# Patient Record
Sex: FEMALE | Race: WHITE | NOT HISPANIC OR LATINO | URBAN - METROPOLITAN AREA
[De-identification: names, ages, dates, MRNs, and addresses within clinical notes are randomized per-mention and may not be internally consistent; named-entity substitution may affect disease eponyms.]

---

## 2017-07-25 ENCOUNTER — APPOINTMENT (OUTPATIENT)
Dept: URBAN - METROPOLITAN AREA CLINIC 200 | Age: 67
Setting detail: DERMATOLOGY
End: 2017-07-26

## 2017-07-25 DIAGNOSIS — L57.0 ACTINIC KERATOSIS: ICD-10-CM

## 2017-07-25 DIAGNOSIS — L57.8 OTHER SKIN CHANGES DUE TO CHRONIC EXPOSURE TO NONIONIZING RADIATION: ICD-10-CM

## 2017-07-25 PROBLEM — Z85.828 PERSONAL HISTORY OF OTHER MALIGNANT NEOPLASM OF SKIN: Status: ACTIVE | Noted: 2017-07-25

## 2017-07-25 PROBLEM — D48.5 NEOPLASM OF UNCERTAIN BEHAVIOR OF SKIN: Status: ACTIVE | Noted: 2017-07-25

## 2017-07-25 PROCEDURE — 11100: CPT | Mod: 59

## 2017-07-25 PROCEDURE — OTHER BIOPSY BY SHAVE METHOD: OTHER

## 2017-07-25 PROCEDURE — 99213 OFFICE O/P EST LOW 20 MIN: CPT | Mod: 25

## 2017-07-25 PROCEDURE — 17000 DESTRUCT PREMALG LESION: CPT

## 2017-07-25 PROCEDURE — OTHER COUNSELING: OTHER

## 2017-07-25 PROCEDURE — OTHER LIQUID NITROGEN: OTHER

## 2017-07-25 ASSESSMENT — LOCATION ZONE DERM
LOCATION ZONE: LEG
LOCATION ZONE: TRUNK

## 2017-07-25 ASSESSMENT — LOCATION SIMPLE DESCRIPTION DERM
LOCATION SIMPLE: RIGHT PRETIBIAL REGION
LOCATION SIMPLE: RIGHT UPPER BACK

## 2017-07-25 ASSESSMENT — LOCATION DETAILED DESCRIPTION DERM
LOCATION DETAILED: RIGHT DISTAL PRETIBIAL REGION
LOCATION DETAILED: RIGHT LATERAL UPPER BACK

## 2017-07-25 NOTE — PROCEDURE: BIOPSY BY SHAVE METHOD
Dressing: bandage
Notification Instructions: Patient will be notified of biopsy results. However, patient instructed to call the office if not contacted within 2 weeks.
Bill For Surgical Tray: no
Biopsy Method: Personna blade
Type Of Destruction Used: Curettage
Additional Anesthesia Volume In Cc (Will Not Render If 0): 0
Hemostasis: Drysol
Anesthesia Volume In Cc (Will Not Render If 0): 0.1
consent was obtained and risks were reviewed including but not limited to scarring, infection, bleeding, scabbing, incomplete removal, nerve damage and allergy to anesthesia.
Size Of Lesion In Cm: 0.4
Post-Care Instructions: I reviewed with the patient in detail post-care instructions. Patient is to keep the biopsy site dry overnight, and then apply bacitracin twice daily until healed. Patient may apply hydrogen peroxide soaks to remove any crusting.
Billing Type: Third-Party Bill
Detail Level: Detailed
Wound Care: Vaseline
Biopsy Type: H and E
Anesthesia Type: 1% lidocaine with epinephrine

## 2017-07-25 NOTE — PROCEDURE: LIQUID NITROGEN
Post-Care Instructions: I reviewed with the patient in detail post-care instructions. Patient is to wear sunprotection, and avoid picking at any of the treated lesions. Pt may apply Vaseline to crusted or scabbing areas.
Detail Level: Detailed
Number Of Freeze-Thaw Cycles: 2 freeze-thaw cycles
Consent: The patient's consent was obtained including but not limited to risks of crusting, scabbing, blistering, scarring, darker or lighter pigmentary change, recurrence, incomplete removal and infection.
Render Post-Care Instructions In Note?: no
Duration Of Freeze Thaw-Cycle (Seconds): 10

## 2017-10-11 ENCOUNTER — APPOINTMENT (OUTPATIENT)
Dept: URBAN - METROPOLITAN AREA CLINIC 200 | Age: 67
Setting detail: DERMATOLOGY
End: 2017-10-15

## 2017-10-11 DIAGNOSIS — L57.0 ACTINIC KERATOSIS: ICD-10-CM

## 2017-10-11 PROCEDURE — OTHER LIQUID NITROGEN: OTHER

## 2017-10-11 PROCEDURE — 17000 DESTRUCT PREMALG LESION: CPT

## 2017-10-11 ASSESSMENT — LOCATION DETAILED DESCRIPTION DERM: LOCATION DETAILED: LEFT SUPERIOR VERMILION BORDER

## 2017-10-11 ASSESSMENT — LOCATION SIMPLE DESCRIPTION DERM: LOCATION SIMPLE: LEFT UPPER LIP

## 2017-10-11 ASSESSMENT — LOCATION ZONE DERM: LOCATION ZONE: LIP

## 2017-10-11 NOTE — PROCEDURE: LIQUID NITROGEN
Post-Care Instructions: I reviewed with the patient in detail post-care instructions. Patient is to wear sunprotection, and avoid picking at any of the treated lesions. Pt may apply Vaseline to crusted or scabbing areas.
Consent: The patient's consent was obtained including but not limited to risks of crusting, scabbing, blistering, scarring, darker or lighter pigmentary change, recurrence, incomplete removal and infection.
Number Of Freeze-Thaw Cycles: 2 freeze-thaw cycles
Render Post-Care Instructions In Note?: no
Detail Level: Detailed
Duration Of Freeze Thaw-Cycle (Seconds): 10

## 2021-02-14 ENCOUNTER — APPOINTMENT (EMERGENCY)
Dept: RADIOLOGY | Facility: HOSPITAL | Age: 71
End: 2021-02-14
Payer: MEDICARE

## 2021-02-14 ENCOUNTER — APPOINTMENT (EMERGENCY)
Dept: RADIOLOGY | Facility: HOSPITAL | Age: 71
End: 2021-02-14
Attending: EMERGENCY MEDICINE
Payer: MEDICARE

## 2021-02-14 ENCOUNTER — HOSPITAL ENCOUNTER (EMERGENCY)
Facility: HOSPITAL | Age: 71
Discharge: HOME | End: 2021-02-14
Attending: EMERGENCY MEDICINE
Payer: MEDICARE

## 2021-02-14 VITALS
WEIGHT: 200 LBS | BODY MASS INDEX: 34.15 KG/M2 | HEIGHT: 64 IN | SYSTOLIC BLOOD PRESSURE: 150 MMHG | OXYGEN SATURATION: 99 % | TEMPERATURE: 99 F | RESPIRATION RATE: 18 BRPM | HEART RATE: 66 BPM | DIASTOLIC BLOOD PRESSURE: 70 MMHG

## 2021-02-14 DIAGNOSIS — G89.29 CHRONIC CHEST PAIN: Primary | ICD-10-CM

## 2021-02-14 DIAGNOSIS — M25.562 CHRONIC PAIN OF LEFT KNEE: ICD-10-CM

## 2021-02-14 DIAGNOSIS — G89.29 CHRONIC PAIN OF LEFT KNEE: ICD-10-CM

## 2021-02-14 DIAGNOSIS — R07.9 CHRONIC CHEST PAIN: Primary | ICD-10-CM

## 2021-02-14 LAB
ALBUMIN SERPL-MCNC: 3.9 G/DL (ref 3.4–5)
ALP SERPL-CCNC: 81 IU/L (ref 35–126)
ALT SERPL-CCNC: 13 IU/L (ref 11–54)
ANION GAP SERPL CALC-SCNC: 7 MEQ/L (ref 3–15)
AST SERPL-CCNC: 23 IU/L (ref 15–41)
BASOPHILS # BLD: 0.04 K/UL (ref 0.01–0.1)
BASOPHILS NFR BLD: 0.6 %
BILIRUB SERPL-MCNC: 0.5 MG/DL (ref 0.3–1.2)
BUN SERPL-MCNC: 16 MG/DL (ref 8–20)
CALCIUM SERPL-MCNC: 8.7 MG/DL (ref 8.9–10.3)
CHLORIDE SERPL-SCNC: 105 MEQ/L (ref 98–109)
CO2 SERPL-SCNC: 27 MEQ/L (ref 22–32)
CREAT SERPL-MCNC: 0.7 MG/DL (ref 0.6–1.1)
D DIMER PPP IA.FEU-MCNC: 0.56 UG/ML FEU (ref 0–0.5)
DIFFERENTIAL METHOD BLD: ABNORMAL
EOSINOPHIL # BLD: 0.48 K/UL (ref 0.04–0.36)
EOSINOPHIL NFR BLD: 6.8 %
ERYTHROCYTE [DISTWIDTH] IN BLOOD BY AUTOMATED COUNT: 13.6 % (ref 11.7–14.4)
GFR SERPL CREATININE-BSD FRML MDRD: >60 ML/MIN/1.73M*2
GLUCOSE SERPL-MCNC: 101 MG/DL (ref 70–99)
HCT VFR BLDCO AUTO: 39.1 % (ref 35–45)
HGB BLD-MCNC: 12.4 G/DL (ref 11.8–15.7)
IMM GRANULOCYTES # BLD AUTO: 0.01 K/UL (ref 0–0.08)
IMM GRANULOCYTES NFR BLD AUTO: 0.1 %
LYMPHOCYTES # BLD: 1.94 K/UL (ref 1.2–3.5)
LYMPHOCYTES NFR BLD: 27.3 %
MCH RBC QN AUTO: 29.1 PG (ref 28–33.2)
MCHC RBC AUTO-ENTMCNC: 31.7 G/DL (ref 32.2–35.5)
MCV RBC AUTO: 91.8 FL (ref 83–98)
MONOCYTES # BLD: 0.52 K/UL (ref 0.28–0.8)
MONOCYTES NFR BLD: 7.3 %
NEUTROPHILS # BLD: 4.12 K/UL (ref 1.7–7)
NEUTS SEG NFR BLD: 57.9 %
NRBC BLD-RTO: 0 %
PDW BLD AUTO: 10.5 FL (ref 9.4–12.3)
PLATELET # BLD AUTO: 287 K/UL (ref 150–369)
POTASSIUM SERPL-SCNC: 5 MEQ/L (ref 3.6–5.1)
PROT SERPL-MCNC: 7.2 G/DL (ref 6–8.2)
RBC # BLD AUTO: 4.26 M/UL (ref 3.93–5.22)
SODIUM SERPL-SCNC: 139 MEQ/L (ref 136–144)
TROPONIN I SERPL-MCNC: <0.02 NG/ML
WBC # BLD AUTO: 7.11 K/UL (ref 3.8–10.5)

## 2021-02-14 PROCEDURE — 96361 HYDRATE IV INFUSION ADD-ON: CPT

## 2021-02-14 PROCEDURE — 3E0337Z INTRODUCTION OF ELECTROLYTIC AND WATER BALANCE SUBSTANCE INTO PERIPHERAL VEIN, PERCUTANEOUS APPROACH: ICD-10-PCS | Performed by: EMERGENCY MEDICINE

## 2021-02-14 PROCEDURE — 25800000 HC PHARMACY IV SOLUTIONS: Performed by: NURSE PRACTITIONER

## 2021-02-14 PROCEDURE — 80053 COMPREHEN METABOLIC PANEL: CPT | Performed by: NURSE PRACTITIONER

## 2021-02-14 PROCEDURE — 96360 HYDRATION IV INFUSION INIT: CPT

## 2021-02-14 PROCEDURE — 73564 X-RAY EXAM KNEE 4 OR MORE: CPT | Mod: LT

## 2021-02-14 PROCEDURE — 93005 ELECTROCARDIOGRAM TRACING: CPT | Performed by: NURSE PRACTITIONER

## 2021-02-14 PROCEDURE — 71045 X-RAY EXAM CHEST 1 VIEW: CPT

## 2021-02-14 PROCEDURE — 36415 COLL VENOUS BLD VENIPUNCTURE: CPT | Performed by: NURSE PRACTITIONER

## 2021-02-14 PROCEDURE — 99284 EMERGENCY DEPT VISIT MOD MDM: CPT | Mod: 25

## 2021-02-14 PROCEDURE — 84484 ASSAY OF TROPONIN QUANT: CPT | Performed by: NURSE PRACTITIONER

## 2021-02-14 PROCEDURE — 85379 FIBRIN DEGRADATION QUANT: CPT | Performed by: NURSE PRACTITIONER

## 2021-02-14 PROCEDURE — 85025 COMPLETE CBC W/AUTO DIFF WBC: CPT | Performed by: NURSE PRACTITIONER

## 2021-02-14 RX ORDER — FLUOXETINE HYDROCHLORIDE 20 MG/1
20 CAPSULE ORAL DAILY
COMMUNITY
Start: 2020-12-21

## 2021-02-14 RX ORDER — ACETAMINOPHEN AND CODEINE PHOSPHATE 300; 30 MG/1; MG/1
TABLET ORAL
COMMUNITY
Start: 2021-02-08

## 2021-02-14 RX ORDER — SODIUM CHLORIDE 9 MG/ML
100 INJECTION, SOLUTION INTRAVENOUS CONTINUOUS
Status: DISCONTINUED | OUTPATIENT
Start: 2021-02-14 | End: 2021-02-14 | Stop reason: HOSPADM

## 2021-02-14 RX ORDER — FAMOTIDINE 20 MG/1
20 TABLET, FILM COATED ORAL 2 TIMES DAILY
Qty: 60 TABLET | Refills: 0 | Status: SHIPPED | OUTPATIENT
Start: 2021-02-14 | End: 2021-03-16

## 2021-02-14 RX ADMIN — SODIUM CHLORIDE 100 ML/HR: 9 INJECTION, SOLUTION INTRAVENOUS at 13:57

## 2021-02-14 ASSESSMENT — ENCOUNTER SYMPTOMS
SHORTNESS OF BREATH: 1
ABDOMINAL PAIN: 0
HEADACHES: 0
FEVER: 0
CONFUSION: 0
MYALGIAS: 1

## 2021-02-14 NOTE — ED ATTESTATION NOTE
2/14/20212:49 PM  I have personally seen and examined the patient.  I reviewed and agree with the PA/NP/Resident's assessment and plan of care.    My examination, assessment, and plan of care of Susi Burris is as follows:  The patient presents with chest pain shortness of breath.  70-year-old female comes emergency room of ongoing chest discomfort.  She has had it for several months.  Seems to be worse when she lays down better when she sits up.  She talked to her primary care doctor about it who referred her to a cardiologist but she has not gotten to see him yet.  Also complaining of shortness of breath with exertion.  No fever chills no cough.  Does have a history of pneumonia 3 times.  Additionally the patient fell yesterday and injured her left knee.  Exam: Well-developed female no obvious distress.  HEENT is unremarkable.  Neck is supple full range of motion.  No respiratory distress.  Awake alert and oriented.  Ambulates with a slight limp.  Impression/Plan: We will check labs and imaging.  Patient's vital signs were all unremarkable.  Her work-up was notable for a slightly elevated D-dimer but age-adjusted is normal.  Do not think she needs to be admitted at this point.  Do recommend to follow-up with cardiology for further testing.    Vital Signs Review: Vital signs have been reviewed. The oxygen saturation is  SpO2: 99 % which is normal.    I was physically present for the key/critical portions of the following procedures: None    This document was created using dragon dictation software.  There might be some typographical errors due to this technology.     Mono Jalloh MD  02/14/21 8485

## 2021-02-14 NOTE — ED PROVIDER NOTES
Chief Complaint   Patient presents with   • Chest Pain   • Shortness of Breath   • Fall   • Lower Extremity Issue        HPI   70-year-old female reports to emergency room today with a complaint of chest pain that has been ongoing for months.  She states that she feels a squeezing sensation in her chest it comes and goes is worse whenever she is lying on her left side she feels that she has shortness of breath with exertion. No pain at this time. She was out of her home on a break  from her son and came to ED for a check up    Very concerned that she has been unable to get her covid vaccine- requesting immunization      She has been advised by her primary care physician to see a cardiologist and has been given a consultation but has never seen one.  She feels that she has been unable to make this appointment due to the stressful living situation she has with a severely psychotic son who requires a lot of her attention.  In addition to this complaint she has had left knee pain which is acute on chronic she was advised to see the orthopedist but has been unable to keep that appointment.  Yesterday she fell on the ice injuring the left knee again.      Past Medical History:   Diagnosis Date   • Depression    • Pneumonia          Past Surgical History:   Procedure Laterality Date   •  SECTION         History reviewed. No pertinent family history.    Social History     Tobacco Use   • Smoking status: Never Smoker   • Smokeless tobacco: Never Used   Substance Use Topics   • Alcohol use: Not Currently   • Drug use: Never       Systems Reviewed from Nursing Triage:              Review of Systems   Constitutional: Negative for fever.   HENT: Negative for congestion.    Respiratory: Positive for shortness of breath.    Cardiovascular: Positive for chest pain.   Gastrointestinal: Negative for abdominal pain.   Musculoskeletal: Positive for myalgias.   Allergic/Immunologic: Negative for immunocompromised state.  "  Neurological: Negative for headaches.   Psychiatric/Behavioral: Negative for confusion.            ED Triage Vitals [02/14/21 1328]   Temp Heart Rate Resp BP SpO2   37.2 °C (99 °F) 77 20 126/60 99 %      Temp src Heart Rate Source Patient Position BP Location FiO2 (%) (Set)   -- -- Lying Right upper arm --       Vitals:    02/14/21 1322 02/14/21 1328   BP:  126/60   BP Location:  Right upper arm   Patient Position:  Lying   Pulse:  77   Resp:  20   Temp:  37.2 °C (99 °F)   SpO2:  99%   Weight: 90.7 kg (200 lb)    Height: 1.626 m (5' 4\")                          Physical Exam  Vitals signs and nursing note reviewed.   Constitutional:       Appearance: She is well-developed.   HENT:      Head: Normocephalic.   Neck:      Musculoskeletal: Normal range of motion and neck supple.   Cardiovascular:      Rate and Rhythm: Normal rate and regular rhythm.      Heart sounds: Normal heart sounds.   Pulmonary:      Effort: Pulmonary effort is normal.   Chest:      Chest wall: No tenderness.   Abdominal:      Palpations: Abdomen is soft.      Tenderness: There is no abdominal tenderness.   Musculoskeletal:      Left lower leg: She exhibits tenderness.      Comments: Left knee tenderness   Skin:     General: Skin is warm.   Neurological:      Mental Status: She is alert and oriented to person, place, and time.   Psychiatric:         Mood and Affect: Mood is anxious.              Labs Reviewed   CBC AND DIFF - Abnormal       Result Value    WBC 7.11      RBC 4.26      Hemoglobin 12.4      Hematocrit 39.1      MCV 91.8      MCH 29.1      MCHC 31.7 (*)     RDW 13.6      Platelets 287      MPV 10.5      Differential Type Auto      nRBC 0.0      Immature Granulocytes 0.1      Neutrophils 57.9      Lymphocytes 27.3      Monocytes 7.3      Eosinophils 6.8      Basophils 0.6      Immature Granulocytes, Absolute 0.01      Neutrophils, Absolute 4.12      Lymphocytes, Absolute 1.94      Monocytes, Absolute 0.52      Eosinophils, Absolute " 0.48 (*)     Basophils, Absolute 0.04     COMPREHENSIVE METABOLIC PANEL   TROPONIN I   RAINBOW DRAW PANEL    Narrative:     The following orders were created for panel order [facility] Draw Panel.  Procedure                               Abnormality         Status                     ---------                               -----------         ------                     RAINBOW RED[[id number]]                                       In process                 RAINBOW LT BLUE[[id number]]                                                              RAINBOW GOLD[[id number]]                                      In process                   Please view results for these tests on the individual orders.   D-DIMER   RAINBOW RED   RAINBOW LT BLUE   RAINBOW GOLD       ECG 12 lead    (Results Pending)   X-RAY CHEST 1 VIEW    (Results Pending)   X-RAY KNEE LEFT 4+ VIEWS    (Results Pending)         Procedures    Final diagnoses:   None       ED Course & MDM     ED Course as of Feb 14 2002   Sun Feb 14, 2021   6009 70-year-old female reports to emergency room for evaluation due to complaints of months of chest pain and pain in her left knee.  In addition to exertional shortness of breath  The patient has been advised by her primary care physician to see both an orthopedist and a cardiologist but she has not been able to do this due to dealing with her son who has a significant psychological illness.  No acute findings are noted on today's EKG her troponin is negative D-dimer is acceptable for her age.  Chest x-ray is negative and so was her knee.  I strongly feel that she should see a cardiologist and I am giving her the name of cardiologist and we will help arrange an appointment -patient will see orthopedics in addition  In addition to her follow-ups we will place her on  Pepcid  Patient was seen by attending Dr. Jalloh who is in agreement with this plan    [LB]      ED Course User Index  [LB] Dian Tang CRNP         Clinical  Impressions as of Feb 14 2002   Chronic chest pain   Chronic pain of left knee           MDM    1:54 PM    Impression: Chest pain exertional shortness of breath acute on chronic knee pain    Plan: X-ray her left knee cardiac work-up with a D-dimer chest x-ray    RESHMA [place]  2/14/2021           Dian Tang, RESHMA  02/14/21 1357       Dian Tang CRNP  02/14/21 2005

## 2021-02-14 NOTE — DISCHARGE INSTRUCTIONS
You were seen in the emergency room today related to chest pain that has been going on for several months.  On today's testing no acute findings were noted with your troponin or your EKG we feel strongly that you should need to follow-up with a cardiologist for further evaluation.  It is important that you take care of yourself at this time    I am giving the cardiologist office your contact information and they will call you to arrange a follow-up appointment    Follow-up with the orthopedic physician related to knee pain    Return for worsening symptoms    We will start you on Pepcid 1 tablet twice a day in the if fact that some of this may be related to reflux

## 2021-02-15 LAB
ATRIAL RATE: 75
P AXIS: 61
PR INTERVAL: 126
QRS DURATION: 78
QT INTERVAL: 380
QTC CALCULATION(BAZETT): 424
R AXIS: 65
T WAVE AXIS: 41
VENTRICULAR RATE: 75

## 2025-07-06 ENCOUNTER — HOSPITAL ENCOUNTER (EMERGENCY)
Facility: HOSPITAL | Age: 75
Discharge: HOME | End: 2025-07-06
Attending: EMERGENCY MEDICINE | Admitting: EMERGENCY MEDICINE
Payer: COMMERCIAL

## 2025-07-06 VITALS
DIASTOLIC BLOOD PRESSURE: 65 MMHG | SYSTOLIC BLOOD PRESSURE: 148 MMHG | WEIGHT: 203.93 LBS | HEART RATE: 86 BPM | HEIGHT: 64 IN | OXYGEN SATURATION: 95 % | BODY MASS INDEX: 34.82 KG/M2 | TEMPERATURE: 98.8 F | RESPIRATION RATE: 18 BRPM

## 2025-07-06 DIAGNOSIS — R19.7 VOMITING AND DIARRHEA: Primary | ICD-10-CM

## 2025-07-06 DIAGNOSIS — R11.10 VOMITING AND DIARRHEA: Primary | ICD-10-CM

## 2025-07-06 LAB
ALBUMIN SERPL-MCNC: 4.1 G/DL (ref 3.5–5.7)
ALP SERPL-CCNC: 80 IU/L (ref 34–125)
ALT SERPL-CCNC: 10 IU/L (ref 7–52)
ANION GAP SERPL CALC-SCNC: 6 MEQ/L (ref 3–15)
AST SERPL-CCNC: 13 IU/L (ref 13–39)
ATRIAL RATE: 86
BASOPHILS # BLD: 0.04 K/UL (ref 0.01–0.1)
BASOPHILS NFR BLD: 0.5 %
BILIRUB SERPL-MCNC: 0.4 MG/DL (ref 0.3–1.2)
BUN SERPL-MCNC: 14 MG/DL (ref 7–25)
CALCIUM SERPL-MCNC: 9.1 MG/DL (ref 8.6–10.3)
CHLORIDE SERPL-SCNC: 105 MEQ/L (ref 98–107)
CO2 SERPL-SCNC: 28 MEQ/L (ref 21–31)
CREAT SERPL-MCNC: 0.8 MG/DL (ref 0.6–1.2)
DIFFERENTIAL METHOD BLD: ABNORMAL
EGFRCR SERPLBLD CKD-EPI 2021: >60 ML/MIN/1.73M*2
EOSINOPHIL # BLD: 0.41 K/UL (ref 0.04–0.36)
EOSINOPHIL NFR BLD: 5.4 %
ERYTHROCYTE [DISTWIDTH] IN BLOOD BY AUTOMATED COUNT: 13.3 % (ref 11.7–14.4)
GLUCOSE SERPL-MCNC: 151 MG/DL (ref 70–99)
HCT VFR BLD AUTO: 36 % (ref 35–45)
HGB BLD-MCNC: 11.9 G/DL (ref 11.8–15.7)
IMM GRANULOCYTES # BLD AUTO: 0.02 K/UL (ref 0–0.08)
IMM GRANULOCYTES NFR BLD AUTO: 0.3 %
LIPASE SERPL-CCNC: 35 U/L (ref 11–82)
LYMPHOCYTES # BLD: 0.6 K/UL (ref 1.2–3.5)
LYMPHOCYTES NFR BLD: 8 %
MCH RBC QN AUTO: 29.8 PG (ref 28–33.2)
MCHC RBC AUTO-ENTMCNC: 33.1 G/DL (ref 32.2–35.5)
MCV RBC AUTO: 90 FL (ref 83–98)
MONOCYTES # BLD: 1.14 K/UL (ref 0.28–0.8)
MONOCYTES NFR BLD: 15.1 %
NEUTROPHILS # BLD: 5.32 K/UL (ref 1.7–7)
NEUTS SEG NFR BLD: 70.7 %
NRBC BLD-RTO: 0 %
P AXIS: 72
PLATELET # BLD AUTO: 205 K/UL (ref 150–369)
PMV BLD AUTO: 11.3 FL (ref 9.4–12.3)
POTASSIUM SERPL-SCNC: 4.3 MEQ/L (ref 3.5–5.1)
PR INTERVAL: 130
PROT SERPL-MCNC: 7 G/DL (ref 6–8.2)
QRS DURATION: 82
QT INTERVAL: 366
QTC CALCULATION(BAZETT): 437
R AXIS: 62
RBC # BLD AUTO: 4 M/UL (ref 3.93–5.22)
SODIUM SERPL-SCNC: 139 MEQ/L (ref 136–145)
T WAVE AXIS: 48
VENTRICULAR RATE: 86
WBC # BLD AUTO: 7.53 K/UL (ref 3.8–10.5)

## 2025-07-06 PROCEDURE — 96374 THER/PROPH/DIAG INJ IV PUSH: CPT

## 2025-07-06 PROCEDURE — 3E033GC INTRODUCTION OF OTHER THERAPEUTIC SUBSTANCE INTO PERIPHERAL VEIN, PERCUTANEOUS APPROACH: ICD-10-PCS | Performed by: EMERGENCY MEDICINE

## 2025-07-06 PROCEDURE — 85025 COMPLETE CBC W/AUTO DIFF WBC: CPT | Performed by: EMERGENCY MEDICINE

## 2025-07-06 PROCEDURE — 36415 COLL VENOUS BLD VENIPUNCTURE: CPT | Performed by: EMERGENCY MEDICINE

## 2025-07-06 PROCEDURE — 3E0337Z INTRODUCTION OF ELECTROLYTIC AND WATER BALANCE SUBSTANCE INTO PERIPHERAL VEIN, PERCUTANEOUS APPROACH: ICD-10-PCS | Performed by: EMERGENCY MEDICINE

## 2025-07-06 PROCEDURE — 63700000 HC SELF-ADMINISTRABLE DRUG: Performed by: EMERGENCY MEDICINE

## 2025-07-06 PROCEDURE — 99284 EMERGENCY DEPT VISIT MOD MDM: CPT | Mod: 25

## 2025-07-06 PROCEDURE — 96376 TX/PRO/DX INJ SAME DRUG ADON: CPT

## 2025-07-06 PROCEDURE — 83690 ASSAY OF LIPASE: CPT | Performed by: EMERGENCY MEDICINE

## 2025-07-06 PROCEDURE — 93010 ELECTROCARDIOGRAM REPORT: CPT | Performed by: INTERNAL MEDICINE

## 2025-07-06 PROCEDURE — 63600000 HC DRUGS/DETAIL CODE: Mod: JZ | Performed by: EMERGENCY MEDICINE

## 2025-07-06 PROCEDURE — 96361 HYDRATE IV INFUSION ADD-ON: CPT

## 2025-07-06 PROCEDURE — 25800000 HC PHARMACY IV SOLUTIONS: Performed by: EMERGENCY MEDICINE

## 2025-07-06 PROCEDURE — 93005 ELECTROCARDIOGRAM TRACING: CPT | Performed by: EMERGENCY MEDICINE

## 2025-07-06 PROCEDURE — 80053 COMPREHEN METABOLIC PANEL: CPT | Performed by: EMERGENCY MEDICINE

## 2025-07-06 RX ORDER — LOPERAMIDE HCL 2 MG
2 TABLET ORAL 4 TIMES DAILY PRN
Qty: 20 TABLET | Refills: 0 | Status: SHIPPED | OUTPATIENT
Start: 2025-07-06

## 2025-07-06 RX ORDER — ONDANSETRON HYDROCHLORIDE 2 MG/ML
4 INJECTION, SOLUTION INTRAVENOUS ONCE
Status: COMPLETED | OUTPATIENT
Start: 2025-07-06 | End: 2025-07-06

## 2025-07-06 RX ORDER — ACETAMINOPHEN 325 MG/1
975 TABLET ORAL ONCE
Status: COMPLETED | OUTPATIENT
Start: 2025-07-06 | End: 2025-07-06

## 2025-07-06 RX ORDER — ONDANSETRON 4 MG/1
4 TABLET, ORALLY DISINTEGRATING ORAL EVERY 8 HOURS PRN
Qty: 20 TABLET | Refills: 0 | Status: SHIPPED | OUTPATIENT
Start: 2025-07-06 | End: 2025-07-13

## 2025-07-06 RX ORDER — LOPERAMIDE HYDROCHLORIDE 2 MG/1
4 CAPSULE ORAL AS NEEDED
Status: DISCONTINUED | OUTPATIENT
Start: 2025-07-06 | End: 2025-07-06 | Stop reason: HOSPADM

## 2025-07-06 RX ADMIN — ACETAMINOPHEN 975 MG: 325 TABLET ORAL at 07:25

## 2025-07-06 RX ADMIN — SODIUM CHLORIDE 1000 ML: 9 INJECTION, SOLUTION INTRAVENOUS at 06:09

## 2025-07-06 RX ADMIN — ONDANSETRON 4 MG: 2 INJECTION INTRAMUSCULAR; INTRAVENOUS at 07:24

## 2025-07-06 RX ADMIN — ONDANSETRON 4 MG: 2 INJECTION INTRAMUSCULAR; INTRAVENOUS at 06:09

## 2025-07-06 NOTE — DISCHARGE INSTRUCTIONS
You were seen in the emergency department for concerns for nausea vomiting diarrhea.  Please use the medications we have prescribed    If you have worsening abdominal pain high-grade fevers or you are unable to tolerate your medications please return to the ER

## 2025-07-06 NOTE — ED PROVIDER NOTES
Emergency Medicine Note  HPI   HISTORY OF PRESENT ILLNESS     74-year-old female coming in today with concerns for nausea vomiting diarrhea states that she had a frozen meal that she left out put in the fridge and then reheated and feels that she may have had food poisoning from that    Patient states that she has been having nausea vomiting diarrhea for the past 24 hours    Patient also reports that she has chest pain that is more of a fluttering states has been going on for 1 year that pain is not different in any way she states that she has had a follow-up with her primary who is recommending a Holter monitor.  Patient denies any new chest pain or shortness of breath or trouble breathing          Patient History   PAST HISTORY     Reviewed from Nursing Triage:       Past Medical History:   Diagnosis Date   • Depression    • Pneumonia        Past Surgical History   Procedure Laterality Date   •  section         No family history on file.    Social History     Tobacco Use   • Smoking status: Never   • Smokeless tobacco: Never   Substance Use Topics   • Alcohol use: Not Currently   • Drug use: Never         Review of Systems   REVIEW OF SYSTEMS     Review of Systems      VITALS     ED Vitals      Date/Time Temp Pulse Resp BP SpO2 [facility]   25 0548 -- 84 18 199/88 98 % MW                         Physical Exam   PHYSICAL EXAM     Physical Exam  Vitals and nursing note reviewed.   Constitutional:       General: She is not in acute distress.     Appearance: She is well-developed.      Comments: Patient holding vomit bag   HENT:      Head: Normocephalic and atraumatic.   Eyes:      Conjunctiva/sclera: Conjunctivae normal.   Cardiovascular:      Rate and Rhythm: Normal rate and regular rhythm.   Pulmonary:      Effort: Pulmonary effort is normal.      Breath sounds: Normal breath sounds.   Abdominal:      General: There is no distension.      Palpations: Abdomen is soft. There is no mass.      Tenderness: There  is no abdominal tenderness.   Musculoskeletal:         General: No tenderness or deformity. Normal range of motion.      Cervical back: Normal range of motion.   Skin:     General: Skin is warm and dry.   Neurological:      Mental Status: Mental status is at baseline.   Psychiatric:         Behavior: Behavior normal.           PROCEDURES     Procedures     DATA     Results       None            Imaging Results    None         ECG 12 lead    (Results Pending)       Scoring tools                                  ED Course & MDM   MDM / ED COURSE / CLINICAL IMPRESSION / DISPO     Medical Decision Making  74-year-old female coming in today with concerns for nausea vomit diarrhea concerns for possible food poisoning patient denies any lightheaded or dizziness patient denies any abdominal pain.  On exam has no point tenderness belly is soft no rebound or guarding patient has chest fluttering but has been going on for 1 year, patient has no new symptoms of that and EKG is benign.    Patient is otherwise well-appearing, stable we will provide symptomatic management and reevaluate    Refer to Work Up tab for further evaluation and documentation    History Provided by: Patient  While here I have Reviewed and interpreted lab results      Vital Signs Review: Vital signs have been reviewed. The oxygen saturation is  SpO2: 98 % which is  Normal    This document was created using dragon dictation software.  There might be some typographical errors due to this technology.            Amount and/or Complexity of Data Reviewed  Labs: ordered.  ECG/medicine tests: ordered.    Risk  Prescription drug management.        ED Course as of 07/06/25 0812   Sun Jul 06, 2025   0812 Symptomatic improvement. Tolerating PO. Ambulating without assistance [DW]      ED Course User Index  [DW] Telma Sotomayor MD     Clinical Impression      None                 Michael Islas MD  07/06/25 0614